# Patient Record
Sex: FEMALE | Race: WHITE
[De-identification: names, ages, dates, MRNs, and addresses within clinical notes are randomized per-mention and may not be internally consistent; named-entity substitution may affect disease eponyms.]

---

## 2020-01-22 ENCOUNTER — HOSPITAL ENCOUNTER (INPATIENT)
Dept: HOSPITAL 96 - M.ERS | Age: 85
LOS: 3 days | Discharge: NURSING, REHAB OR LONG-TERM CARE | DRG: 682 | End: 2020-01-25
Attending: INTERNAL MEDICINE | Admitting: INTERNAL MEDICINE
Payer: MEDICARE

## 2020-01-22 VITALS — BODY MASS INDEX: 28.19 KG/M2 | HEIGHT: 67.99 IN | WEIGHT: 186 LBS

## 2020-01-22 VITALS — DIASTOLIC BLOOD PRESSURE: 79 MMHG | SYSTOLIC BLOOD PRESSURE: 114 MMHG

## 2020-01-22 DIAGNOSIS — Z90.710: ICD-10-CM

## 2020-01-22 DIAGNOSIS — Z88.8: ICD-10-CM

## 2020-01-22 DIAGNOSIS — E78.5: ICD-10-CM

## 2020-01-22 DIAGNOSIS — Z90.49: ICD-10-CM

## 2020-01-22 DIAGNOSIS — K21.9: ICD-10-CM

## 2020-01-22 DIAGNOSIS — I50.43: ICD-10-CM

## 2020-01-22 DIAGNOSIS — Z79.899: ICD-10-CM

## 2020-01-22 DIAGNOSIS — E03.9: ICD-10-CM

## 2020-01-22 DIAGNOSIS — G89.29: ICD-10-CM

## 2020-01-22 DIAGNOSIS — E11.9: ICD-10-CM

## 2020-01-22 DIAGNOSIS — Z98.41: ICD-10-CM

## 2020-01-22 DIAGNOSIS — I25.10: ICD-10-CM

## 2020-01-22 DIAGNOSIS — M54.9: ICD-10-CM

## 2020-01-22 DIAGNOSIS — N39.0: ICD-10-CM

## 2020-01-22 DIAGNOSIS — Z98.42: ICD-10-CM

## 2020-01-22 DIAGNOSIS — I13.0: ICD-10-CM

## 2020-01-22 DIAGNOSIS — I48.20: ICD-10-CM

## 2020-01-22 DIAGNOSIS — N18.9: ICD-10-CM

## 2020-01-22 DIAGNOSIS — E87.6: ICD-10-CM

## 2020-01-22 DIAGNOSIS — N17.9: Primary | ICD-10-CM

## 2020-01-22 LAB
ABSOLUTE BASOPHILS: 0.1 THOU/UL (ref 0–0.2)
ABSOLUTE EOSINOPHILS: 0.2 THOU/UL (ref 0–0.7)
ABSOLUTE MONOCYTES: 0.7 THOU/UL (ref 0–1.2)
ALBUMIN SERPL-MCNC: 2.5 G/DL (ref 3.4–5)
ALP SERPL-CCNC: 487 U/L (ref 46–116)
ALT SERPL-CCNC: 29 U/L (ref 30–65)
ANION GAP SERPL CALC-SCNC: 5 MMOL/L (ref 7–16)
APTT BLD: 26 SECONDS (ref 25–31.3)
AST SERPL-CCNC: 14 U/L (ref 15–37)
BACTERIA-REFLEX: (no result) /HPF
BASOPHILS NFR BLD AUTO: 1 %
BILIRUB SERPL-MCNC: 0.6 MG/DL
BILIRUB UR-MCNC: NEGATIVE MG/DL
BUN SERPL-MCNC: 79 MG/DL (ref 7–18)
CALCIUM SERPL-MCNC: 8.3 MG/DL (ref 8.5–10.1)
CHLORIDE SERPL-SCNC: 94 MMOL/L (ref 98–107)
CO2 SERPL-SCNC: 39 MMOL/L (ref 21–32)
COLOR UR: YELLOW
CREAT SERPL-MCNC: 2.4 MG/DL (ref 0.6–1.3)
EOSINOPHIL NFR BLD: 3.2 %
GLUCOSE SERPL-MCNC: 185 MG/DL (ref 70–99)
GRANULOCYTES NFR BLD MANUAL: 67.6 %
HCT VFR BLD CALC: 36.6 % (ref 37–47)
HGB BLD-MCNC: 12.4 GM/DL (ref 12–15)
INR PPP: 1.1
KETONES UR STRIP-MCNC: NEGATIVE MG/DL
LYMPHOCYTES # BLD: 1.4 THOU/UL (ref 0.8–5.3)
LYMPHOCYTES NFR BLD AUTO: 19.4 %
MCH RBC QN AUTO: 32.8 PG (ref 26–34)
MCHC RBC AUTO-ENTMCNC: 33.8 G/DL (ref 28–37)
MCV RBC: 97.1 FL (ref 80–100)
MONOCYTES NFR BLD: 8.8 %
MPV: 8.6 FL. (ref 7.2–11.1)
MUCUS: (no result) STRN/LPF
NEUTROPHILS # BLD: 5 THOU/UL (ref 1.6–8.1)
NT-PRO BRAIN NAT PEPTIDE: 4373 PG/ML (ref ?–300)
NUCLEATED RBCS: 0 /100WBC
PLATELET COUNT*: 171 THOU/UL (ref 150–400)
POTASSIUM SERPL-SCNC: 3 MMOL/L (ref 3.5–5.1)
PROT SERPL-MCNC: 7.4 G/DL (ref 6.4–8.2)
PROT UR QL STRIP: (no result)
PROTHROMBIN TIME: 10.8 SECONDS (ref 9.2–11.5)
RBC # BLD AUTO: 3.77 MIL/UL (ref 4.2–5)
RBC # UR STRIP: (no result) /UL
RBC #/AREA URNS HPF: (no result) /HPF (ref 0–2)
RDW-CV: 16.8 % (ref 10.5–14.5)
SODIUM SERPL-SCNC: 138 MMOL/L (ref 136–145)
SP GR UR STRIP: 1.01 (ref 1–1.03)
SQUAMOUS: (no result) /LPF (ref 0–3)
URINE CLARITY: CLEAR
URINE GLUCOSE-RANDOM: NEGATIVE
URINE LEUKOCYTES-REFLEX: (no result)
URINE NITRITE-REFLEX: NEGATIVE
URINE WBC-REFLEX: (no result) /HPF (ref 0–5)
UROBILINOGEN UR STRIP-ACNC: 0.2 E.U./DL (ref 0.2–1)
WBC # BLD AUTO: 7.4 THOU/UL (ref 4–11)

## 2020-01-23 VITALS — SYSTOLIC BLOOD PRESSURE: 128 MMHG | DIASTOLIC BLOOD PRESSURE: 83 MMHG

## 2020-01-23 VITALS — DIASTOLIC BLOOD PRESSURE: 65 MMHG | SYSTOLIC BLOOD PRESSURE: 106 MMHG

## 2020-01-23 VITALS — SYSTOLIC BLOOD PRESSURE: 118 MMHG | DIASTOLIC BLOOD PRESSURE: 73 MMHG

## 2020-01-23 VITALS — DIASTOLIC BLOOD PRESSURE: 94 MMHG | SYSTOLIC BLOOD PRESSURE: 140 MMHG

## 2020-01-23 VITALS — DIASTOLIC BLOOD PRESSURE: 56 MMHG | SYSTOLIC BLOOD PRESSURE: 136 MMHG

## 2020-01-23 VITALS — SYSTOLIC BLOOD PRESSURE: 113 MMHG | DIASTOLIC BLOOD PRESSURE: 72 MMHG

## 2020-01-23 VITALS — SYSTOLIC BLOOD PRESSURE: 116 MMHG | DIASTOLIC BLOOD PRESSURE: 68 MMHG

## 2020-01-23 LAB
ALBUMIN SERPL-MCNC: 2.3 G/DL (ref 3.4–5)
ALP SERPL-CCNC: 440 U/L (ref 46–116)
ALT SERPL-CCNC: 26 U/L (ref 30–65)
ANION GAP SERPL CALC-SCNC: 1 MMOL/L (ref 7–16)
AST SERPL-CCNC: 14 U/L (ref 15–37)
BILIRUB SERPL-MCNC: 0.8 MG/DL
BUN SERPL-MCNC: 70 MG/DL (ref 7–18)
CALCIUM SERPL-MCNC: 8.3 MG/DL (ref 8.5–10.1)
CHLORIDE SERPL-SCNC: 100 MMOL/L (ref 98–107)
CO2 SERPL-SCNC: 40 MMOL/L (ref 21–32)
CREAT SERPL-MCNC: 2.2 MG/DL (ref 0.6–1.3)
GLUCOSE SERPL-MCNC: 145 MG/DL (ref 70–99)
POTASSIUM SERPL-SCNC: 3.6 MMOL/L (ref 3.5–5.1)
PROT SERPL-MCNC: 6.9 G/DL (ref 6.4–8.2)
SODIUM SERPL-SCNC: 141 MMOL/L (ref 136–145)

## 2020-01-23 NOTE — EKG
[place] [address]
Phone:  [phone]                     ELECTROCARDIOGRAM REPORT      
_______________________________________________________________________________
 
Name:       AARON GODINEZ                 Room:           [address] [place]  
M.R.#:  [id number]      Account #:      [id number]  
Admission:  20     Attend Phys:    Donaldo Ledesma MD 
Discharge:               Date of Birth:  [birth year]  
         Report #: [id number]
    [id number]
_______________________________________________________________________________
THIS REPORT FOR:  //name//                      
 
                         [facility] ED
                                       
Test Date:    2020               Test Time:    21:36:14
Pat Name:     AARON GODINEZ           Department:   
Patient ID:   [id number]            Room:         [facility]
Gender:       F                        Technician:   FL
:          1935               Requested By: Kat Nino
Order Number: [id number] MD:   Luis Alfredo Ewing
                                 Measurements
Intervals                              Axis          
Rate:         144                      P:            
AZ:                                    QRS:          27
QRSD:         99                       T:            186
QT:           315                                    
QTc:          488                                    
                           Interpretive Statements
Atrial fibrillation with rapid V-rate
Borderline low voltage, extremity leads
Repolarization abnormality, prob rate related
No previous ECG available for comparison
 
Electronically Signed On 2020 9:23:42 CST by Luis Alfredo Ewing
[email or web address]
 
 
 
 
 
 
 
 
 
 
 
 
 
 
 
 
 
 
  <ELECTRONICALLY SIGNED>
                                           By: Luis Alfredo Ewing MD, [facility]      
  20
D: 20   _____________________________________
T: 20   Luis Alfredo Ewing MD, [facility]        /EPI

## 2020-01-23 NOTE — NUR
PT ARRIVED AT 0040. ADMISSION HX/ASSESSMENT DONE. PT A&O X 1-2, CONFUSED. ON
2L BY NC. NO C/O PAIN. CARDIZEM DRIPS AS ORDERED. ELECTROLYTE REPLACEMENT IN
PROGRESS. PT INCONTINENT AND DIDNT USE THE CALL LIGHT. HOURLY ROUNDS
COMPLETED. WILL CONTINUE TO MONITOR.

## 2020-01-23 NOTE — 2DMMODE
[place] [address]
Phone:  [phone] 2 D/M-MODE ECHOCARDIOGRAM     
_______________________________________________________________________________
 
Name:         WENDY FANGAARON                Room:          [address] [place] 
HERI#:    [id number]     Account #:     [id number]  
Admission:    20    Attend Phys:   Donaldo Ledesma, 
Discharge:                Date of Birth: [birth year]  
Date of Service: 20 1451  Report #:      [id number]
        [id number]
_______________________________________________________________________________
THIS REPORT FOR:  //name//                      
 
 
--------------- APPROVED REPORT --------------
 
 
Study performed:  2020 10:28:40
 
EXAM: Comprehensive 2D, Doppler, and color-flow 
Echocardiogram 
Patient Location: In-Patient   
Room #:  218     Status:  routine
 
        BSA:         1.99
HR: 120 bpm   BP:          105/71 mmHg 
Rhythm: Atrial Fibrillation     
 
Other Information 
Study Quality: Good
 
Indications
Atrial Fibrillation
 
2D Dimensions
IVSd:  11.44 (7-11mm) LVOT Diam:  20.62 (18-24mm) 
LVDd:  54.03 mm  
PWd:  11.02 (7-11mm) Ascending Ao:  38.17 (22-36mm)
LVDs:  49.12 (25-40mm) 
Aortic Root:  37.83 mm 
 
Volumes
Left Atrial Volume (Systole) 
    LA ESV Index:  63.60 mL/m2
 
Aortic Valve
AoV Peak Julio.:  0.89 m/s 
AO Peak Gr.:  3.18 mmHg  LVOT Max P.81 mmHg
AO Mean Gr.:  2.15 mmHg  LVOT Mean P.93 mmHg
    LVOT Max V:  0.67 m/s
AO V2 VTI:  13.66 cm  LVOT Mean V:  0.45 m/s
HUNTER (VTI):  2.40 cm2  LVOT V1 VTI:  9.83 cm
 
TDI
 Medial E' Julio.:  0.12 m/s
 Lateral E' Julio.:  0.12 m/s
 
 
 
[place] [address]
Phone:  [phone]                     2 D/M-MODE ECHOCARDIOGRAM     
_______________________________________________________________________________
 
Name:         AARON GODINEZ                Room:          [address] [place] 
[facility]#:    [id number]     Account #:     [id number]  
Admission:    20    Attend Phys:   Donaldo Ledesma, 
Discharge:                Date of Birth: [birth year]  
Date of Service: 20 1451  Report #:      [id number]
        [id number]
_______________________________________________________________________________
Pulmonary Valve
PV Peak Julio.:  0.70 m/s PV Peak Gr.:  1.98 mmHg
 
Tricuspid Valve
    RAP Estimate:  5.00 mmHg
TR Peak Gr.:  22.88 mmHg RVSP:  27.00 mmHg
    PA Pressure:  27.00 mmHg
 
Left Ventricle
The left ventricle is normal size. There is global hypokinesis of the 
left ventricle. There is normal left ventricular wall thickness. Left 
ventricular ejection fraction is moderate to severely decreased. LVEF 
is 30-35%. This study is not technically sufficient to allow 
evaluation of the LV diastolic function due to atrial 
fibrillation.
 
Right Ventricle
Right ventricle is moderately dilated. The right ventricular systolic 
function is normal.
 
Atria
Left atrium is severely dilated. Right atrium is severely 
dilated.
 
Aortic Valve
Mild aortic valve sclerosis. No aortic regurgitation is present. 
There is no aortic valvular stenosis.
 
Mitral Valve
There is mitral annular calcification. Mild mitral regurgitation. No 
evidence of mitral valve stenosis.
 
Tricuspid Valve
The tricuspid valve is normal in structure. Mild tricuspid 
regurgitation. The RVSP is 40-45 mmHg.
 
Pulmonic Valve
The pulmonary valve is normal in structure. Trace pulmonic 
regurgitation.
 
Great Vessels
The aortic root is normal in size. IVC is dilated and collapses 
<50% with inspiration. 
 
Pericardium
There is no pericardial effusion.
 
 
[place] [address]
Phone:  [phone]                     2 D/M-MODE ECHOCARDIOGRAM     
_______________________________________________________________________________
 
Name:         AARON GODINEZ                Room:          [address] [place] 
.HERI.#:    [id number]     Account #:     [id number]  
Admission:    20    Attend Phys:   Donaldo Ledesma, 
Discharge:                Date of Birth: [birth year]  
Date of Service: 20 1451  Report #:      [id number]
        [id number]
_______________________________________________________________________________
 
<Conclusion>
The left ventricle is normal size.
There is normal left ventricular wall thickness.
LVEF is 30-35%.
This study is not technically sufficient to allow evaluation of the 
LV diastolic function due to atrial fibrillation.
Left atrium is severely dilated.
Right atrium is severely dilated.
Right ventricle is moderately dilated.
Mild mitral regurgitation.
Trace pulmonic regurgitation.
Mild tricuspid regurgitation.
The RVSP is 40-45 mmHg.
 
 
 
 
 
 
 
 
 
 
 
 
 
 
 
 
 
 
 
 
 
 
 
 
 
 
 
 
 
 
  <ELECTRONICALLY SIGNED>
                                           By: Perfecto Irvin MD, FACC   
  20
D: 20   _____________________________________
T: 20   Perfecto Irvin MD, FACC     /INF

## 2020-01-23 NOTE — NUR
PT ARRIVED FROM ER TO TELE AT 0030. PT REPORTS THAT SHE DOESNT KNOW WHERE SHE
IS OR WHAT YEAR IT IS. PT SMILED AS SHE IS SPEAKING. I AM UNABLE TO TELL IF PT
IS CONFUSED OR NOT BECAUSE IT WAS REPORTED TO ME THAT SHE IS ALERT AND
ORIENTED. REGARDLESS, I AMUNABLE TO GET INFO FROM HER. PT IS ON CARDIZEM AT
5MG/HR. VSS.  NO COMPLAINTS OF PAIN.

## 2020-01-23 NOTE — NUR
ASSUMED CARE OF PT THIS AM AROUND 0715- CARDIAC MONITOR IN PLACE AS ORDERED,
TRACING A-'S- 140'S- CARDIZEM DRIP IN PLACE WITH DOSE INCREASED TO
10ML/HR THIS AM AT 0738, BP NOTED /71 POST INCREASE, DRIP HELD AT THAT
DOSE AT THIS TIME- PT A&O X 1-2 WITH NOTED CONFUSSION- INCONTINENT OF B/B- EXT
A X1 WITH TRANSFERS- LCTA, RESP EVEN AND UN-LABORED- VSS, O2 SAT 91% ON 2L VIA
NC- ABD SOFT/ROUND/NON-TENDER, BS X4 QUADS- IV NOTED TO RIGHT WRIST INTACT
WITH CARDIZEM INFUSSING AS PRESCIBED, IV TO RIGHT AC INTACT AND SL- UE NOTED
WITH +1 EDEMA- BLE 2+ PITTING EDEMA NOTED WITH SLIGHT REDESS- K+ REPLACED WITH
REDRAW NOTED WNL AT 3.6- ECHO ORDERED PER CARDIO THIS AM- CALL LIGHT AND
PERSONAL BELONGINGS WITH IN REACH- HOURLY ROUNDS IN PLACE R/T SAFETY/NEEDS-
ALL NEEDS MET AT THIS TIME-WCTM

## 2020-01-24 VITALS — SYSTOLIC BLOOD PRESSURE: 146 MMHG | DIASTOLIC BLOOD PRESSURE: 92 MMHG

## 2020-01-24 VITALS — SYSTOLIC BLOOD PRESSURE: 133 MMHG | DIASTOLIC BLOOD PRESSURE: 82 MMHG

## 2020-01-24 VITALS — SYSTOLIC BLOOD PRESSURE: 140 MMHG | DIASTOLIC BLOOD PRESSURE: 89 MMHG

## 2020-01-24 VITALS — DIASTOLIC BLOOD PRESSURE: 84 MMHG | SYSTOLIC BLOOD PRESSURE: 137 MMHG

## 2020-01-24 VITALS — DIASTOLIC BLOOD PRESSURE: 71 MMHG | SYSTOLIC BLOOD PRESSURE: 128 MMHG

## 2020-01-24 LAB
ABSOLUTE BASOPHILS: 0 THOU/UL (ref 0–0.2)
ABSOLUTE EOSINOPHILS: 0.2 THOU/UL (ref 0–0.7)
ABSOLUTE MONOCYTES: 0.9 THOU/UL (ref 0–1.2)
ANION GAP SERPL CALC-SCNC: 5 MMOL/L (ref 7–16)
BASOPHILS NFR BLD AUTO: 0.4 %
BUN SERPL-MCNC: 63 MG/DL (ref 7–18)
CALCIUM SERPL-MCNC: 9.1 MG/DL (ref 8.5–10.1)
CHLORIDE SERPL-SCNC: 102 MMOL/L (ref 98–107)
CO2 SERPL-SCNC: 35 MMOL/L (ref 21–32)
CREAT SERPL-MCNC: 1.9 MG/DL (ref 0.6–1.3)
EOSINOPHIL NFR BLD: 2 %
GLUCOSE SERPL-MCNC: 143 MG/DL (ref 70–99)
GRANULOCYTES NFR BLD MANUAL: 71.2 %
HCT VFR BLD CALC: 39.2 % (ref 37–47)
HGB BLD-MCNC: 13.1 GM/DL (ref 12–15)
LYMPHOCYTES # BLD: 1.5 THOU/UL (ref 0.8–5.3)
LYMPHOCYTES NFR BLD AUTO: 16.5 %
MCH RBC QN AUTO: 32.4 PG (ref 26–34)
MCHC RBC AUTO-ENTMCNC: 33.4 G/DL (ref 28–37)
MCV RBC: 96.9 FL (ref 80–100)
MONOCYTES NFR BLD: 9.9 %
MPV: 8.5 FL. (ref 7.2–11.1)
NEUTROPHILS # BLD: 6.5 THOU/UL (ref 1.6–8.1)
NUCLEATED RBCS: 0 /100WBC
PLATELET COUNT*: 166 THOU/UL (ref 150–400)
POTASSIUM SERPL-SCNC: 3.5 MMOL/L (ref 3.5–5.1)
RBC # BLD AUTO: 4.05 MIL/UL (ref 4.2–5)
RDW-CV: 16.6 % (ref 10.5–14.5)
SODIUM SERPL-SCNC: 142 MMOL/L (ref 136–145)
WBC # BLD AUTO: 9.2 THOU/UL (ref 4–11)

## 2020-01-24 NOTE — NUR
VSS, ORIENTED TO SELF ONLY, AFIB ON TELE, NC@2L, INCONTINENT OF BOWEL AND
BLADDER, UP WITH ONE BUT DOES NOT GET OUT OF BED, WILL SIT ON SIDE OF BED.
SPINAL CORD STIMULATOR IN BACK- DO NOT LAY SUPINE OR WILL CAUSE PAIN.
POSSESSIONS AND CALL LIGHT WITHIN REACH. HOURLY ROUNDING PERFORMED

## 2020-01-24 NOTE — NUR
PT REMAINS ON CARD DRIP, MAINTAINING O2 SATS ON 2L NC. DID HAVE EPISODE OF
CONFUSION LAST NIGHT, WANTING TO GET UP FROM BED STATING IT WAS DAY TIME, WAS
EASILY REDIRECTABLE. NO OTHER CONCERNS NOTED BY PT. CURRENTLY ASLEEP IN BED
WITH CALL LIGHT WITHIN REACH AND BED ALARM ON.

## 2020-01-24 NOTE — NUR
CM spoke with dtr via phone. Pt is a LTC resident at [facility] and plan is
for Pt to return there at [place]. Pt is wc bound, staff assist as needed. No o2.
 
Maia Quevedo [phone]

## 2020-01-24 NOTE — NUR
STEPHANIE spoke with admissions at [facility], they are able to accept Pt back at
dc this weekend, if medically stable to go.  faxed clinical info. Nurse
report number is [phone], [place] nurse's station fax number is [phone]

## 2020-01-25 VITALS — DIASTOLIC BLOOD PRESSURE: 96 MMHG | SYSTOLIC BLOOD PRESSURE: 146 MMHG

## 2020-01-25 VITALS — DIASTOLIC BLOOD PRESSURE: 72 MMHG | SYSTOLIC BLOOD PRESSURE: 140 MMHG

## 2020-01-25 VITALS — SYSTOLIC BLOOD PRESSURE: 152 MMHG | DIASTOLIC BLOOD PRESSURE: 87 MMHG

## 2020-01-25 VITALS — DIASTOLIC BLOOD PRESSURE: 84 MMHG | SYSTOLIC BLOOD PRESSURE: 145 MMHG

## 2020-01-25 LAB
ANION GAP SERPL CALC-SCNC: 6 MMOL/L (ref 7–16)
BUN SERPL-MCNC: 48 MG/DL (ref 7–18)
CALCIUM SERPL-MCNC: 9.1 MG/DL (ref 8.5–10.1)
CHLORIDE SERPL-SCNC: 100 MMOL/L (ref 98–107)
CO2 SERPL-SCNC: 38 MMOL/L (ref 21–32)
CREAT SERPL-MCNC: 1.7 MG/DL (ref 0.6–1.3)
GLUCOSE SERPL-MCNC: 124 MG/DL (ref 70–99)
POTASSIUM SERPL-SCNC: 3.5 MMOL/L (ref 3.5–5.1)
SODIUM SERPL-SCNC: 144 MMOL/L (ref 136–145)

## 2020-01-25 NOTE — NUR
VSS, ORIENTED TO SELF AND CONFUSED, AFIB, HOURLY ROUNDING PERFORMED, STAND BY
MAX ASSIST. CALL LIGHT WITHIN REACH. REC DISCHARGE ORDERS, GAVE REPORT TO RN
SHANAE AT SNF. DISCHARGE PACKET AND DISCHARGE SUMMARY FAXED TO SNF. TELE
MONITOR AND IV REMOVED WITHOUT COMPLICATION. PATIENT WILL BE PICKED UP BY
TRANSPORT IN WHEELCHAIR AND TAKEN TO SNF.

## 2020-01-25 NOTE — NUR
PT HAD INCREASED CONFUSION THIS SHIFT, REMOVING LEADS MULTIPLE TIMES, GETTING
OUT OF BED WITHOUT ASSISTANCE AND REMOVING CLOTHING. PT WAS RE-DIRECTABLE.
MAINTAINING O2 SATS ON 2L, PT ALSO CONT TO REMOVE O2 AND WILL BECOME SOA BUT
IS RE-DIRECTABLE AS WELL WITH THIS. PT STATED NO CONCERNS AT THIS TIME. NO C/O
PAIN OR DISCOMFORT NOTED. CURRENTLY ASLEEP IN BED WITH CALL LIGHT WITHIN REACH
AND BED ALARM ON.